# Patient Record
Sex: MALE | Race: BLACK OR AFRICAN AMERICAN | HISPANIC OR LATINO | Employment: FULL TIME | URBAN - METROPOLITAN AREA
[De-identification: names, ages, dates, MRNs, and addresses within clinical notes are randomized per-mention and may not be internally consistent; named-entity substitution may affect disease eponyms.]

---

## 2023-10-11 ENCOUNTER — APPOINTMENT (OUTPATIENT)
Dept: LAB | Facility: CLINIC | Age: 23
End: 2023-10-11
Payer: COMMERCIAL

## 2023-10-11 ENCOUNTER — OFFICE VISIT (OUTPATIENT)
Dept: FAMILY MEDICINE CLINIC | Facility: CLINIC | Age: 23
End: 2023-10-11
Payer: COMMERCIAL

## 2023-10-11 VITALS
OXYGEN SATURATION: 98 % | RESPIRATION RATE: 18 BRPM | HEART RATE: 68 BPM | SYSTOLIC BLOOD PRESSURE: 118 MMHG | WEIGHT: 221 LBS | BODY MASS INDEX: 29.29 KG/M2 | DIASTOLIC BLOOD PRESSURE: 72 MMHG | HEIGHT: 73 IN

## 2023-10-11 DIAGNOSIS — Z11.3 SCREENING EXAMINATION FOR STD (SEXUALLY TRANSMITTED DISEASE): ICD-10-CM

## 2023-10-11 DIAGNOSIS — Z72.51 HISTORY OF UNPROTECTED SEX: ICD-10-CM

## 2023-10-11 DIAGNOSIS — Z11.59 NEED FOR HEPATITIS C SCREENING TEST: ICD-10-CM

## 2023-10-11 DIAGNOSIS — Z00.00 ANNUAL PHYSICAL EXAM: Primary | ICD-10-CM

## 2023-10-11 DIAGNOSIS — Z11.4 SCREENING FOR HIV (HUMAN IMMUNODEFICIENCY VIRUS): ICD-10-CM

## 2023-10-11 DIAGNOSIS — Z11.3 SCREENING FOR STDS (SEXUALLY TRANSMITTED DISEASES): ICD-10-CM

## 2023-10-11 LAB
HCV AB SER QL: NORMAL
HIV 1+2 AB+HIV1 P24 AG SERPL QL IA: NORMAL
HIV 2 AB SERPL QL IA: NORMAL
HIV1 AB SERPL QL IA: NORMAL
HIV1 P24 AG SERPL QL IA: NORMAL
TREPONEMA PALLIDUM IGG+IGM AB [PRESENCE] IN SERUM OR PLASMA BY IMMUNOASSAY: NORMAL

## 2023-10-11 PROCEDURE — 87591 N.GONORRHOEAE DNA AMP PROB: CPT

## 2023-10-11 PROCEDURE — 86695 HERPES SIMPLEX TYPE 1 TEST: CPT

## 2023-10-11 PROCEDURE — 87491 CHLMYD TRACH DNA AMP PROBE: CPT

## 2023-10-11 PROCEDURE — 99385 PREV VISIT NEW AGE 18-39: CPT | Performed by: NURSE PRACTITIONER

## 2023-10-11 PROCEDURE — 36415 COLL VENOUS BLD VENIPUNCTURE: CPT

## 2023-10-11 PROCEDURE — 87389 HIV-1 AG W/HIV-1&-2 AB AG IA: CPT

## 2023-10-11 PROCEDURE — 86696 HERPES SIMPLEX TYPE 2 TEST: CPT

## 2023-10-11 PROCEDURE — 86803 HEPATITIS C AB TEST: CPT

## 2023-10-11 PROCEDURE — 86780 TREPONEMA PALLIDUM: CPT

## 2023-10-11 NOTE — PROGRESS NOTES
ADULT ANNUAL [facility]    NAME: Gaurav Rolon  AGE: 26 y.o. SEX: male  : 2000     DATE: 10/11/2023     Assessment and Plan:     Problem List Items Addressed This Visit          Other    Screening for HIV (human immunodeficiency virus)    Relevant Orders    HIV 1/2 AB/AG w Reflex [facility] for 2 yr old and above    History of unprotected sex    Relevant Orders    HIV 1/2 AB/AG w Reflex [facility] for 2 yr old and above    Chlamydia/GC amplified DNA by PCR    RPR-Syphilis Screening (Total Syphilis IGG/IGM)    Herpes I/II IgG RUDDY w Reflex to HSV-2    Need for hepatitis C screening test    Relevant Orders    Hepatitis C antibody    Annual physical exam - Primary  Patient instructed to eat a healthy low fat diet. STD testing ordered. Patient instructed to check with insurance for coverage. Will follow-up results with the patient. Patient does not want any other routine lab work ordered at this time. Patient instructed to follow-up in 1 year for a physical exam or sooner prn. Immunizations and preventive care screenings were discussed with patient today. Appropriate education was printed on patient's after visit summary. Counseling:  Dental Health: discussed importance of regular tooth brushing, flossing, and dental visits. Injury prevention: discussed safety/seat belts, safety helmets, smoke detectors, carbon monoxide detectors,   Exercise: the importance of regular exercise/physical activity was discussed. Recommend exercise 3-5 times per week for at least 30 minutes. BMI Counseling: Body mass index is 29.16 kg/m². The BMI is above normal. Nutrition recommendations include encouraging healthy choices of fruits and vegetables, decreasing fast food intake, consuming healthier snacks, reducing intake of saturated and trans fat and reducing intake of cholesterol. Exercise recommendations include exercising 3-5 times per week.  Rationale for BMI follow-up plan is due to patient being overweight or obese. Depression Screening and Follow-up Plan: Patient was screened for depression during today's encounter. They screened negative with a PHQ-2 score of 0. Return in 1 year (on 10/11/2024) for Annual physical.     Chief Complaint:     Chief Complaint   Patient presents with    Physical Exam      History of Present Illness:     Adult Annual Physical   Patient is here for a comprehensive physical exam.     Patient would like STD testing done. Patient reports that he has been sexually active without protection in the past. Denies any dysuria, penile pain, penile discharge, or rash. Diet and Physical Activity  Diet/Nutrition: high fat diet. Exercise:  Patient reports that he goes to the gym 4-5 days a week for 1-1.5 hours . Depression Screening  PHQ-2/9 Depression Screening    Little interest or pleasure in doing things: 0 - not at all  Feeling down, depressed, or hopeless: 0 - not at all  PHQ-2 Score: 0  PHQ-2 Interpretation: Negative depression screen       General Health  Sleep:  Patient reports 6-7 hours of sleep at night . Hearing: normal - bilateral.  Vision: no vision problems. Dental: no dental visits for >1 year and brushes teeth twice daily.  Health  History of STDs?: no.    Advanced Care Planning  Do you have an advanced directive? no  Do you have a durable medical power of ? no     Review of Systems:     Review of Systems   Constitutional:  Negative for appetite change, chills, fatigue and fever. HENT:  Negative for ear pain, sinus pressure, sore throat and trouble swallowing. Eyes:  Negative for pain, discharge and redness. Respiratory:  Negative for cough, chest tightness, shortness of breath and wheezing. Cardiovascular:  Negative for chest pain, palpitations and leg swelling. Gastrointestinal:  Negative for abdominal pain, blood in stool, diarrhea, nausea and vomiting.    Genitourinary: Negative for dysuria, frequency, hematuria, penile pain and testicular pain. Musculoskeletal:  Negative for arthralgias and myalgias. Skin:  Negative for rash. Neurological:  Negative for dizziness, seizures, syncope, light-headedness and headaches. Psychiatric/Behavioral:  Negative for suicidal ideas. Denies any depression. Past Medical History:     History reviewed. No pertinent past medical history. Past Surgical History:     Past Surgical History:   Procedure Laterality Date    ELBOW SURGERY        Social History:     Social History     Socioeconomic History    Marital status: Single     Spouse name: None    Number of children: None    Years of education: None    Highest education level: None   Occupational History    None   Tobacco Use    Smoking status: Never    Smokeless tobacco: Never   Vaping Use    Vaping Use: Never used   Substance and Sexual Activity    Alcohol use: Yes     Comment: Socially    Drug use: Not Currently    Sexual activity: Yes     Partners: Female     Birth control/protection: Condom Male   Other Topics Concern    None   Social History Narrative    None     Social Determinants of Health     Financial Resource Strain: Not on file   Food Insecurity: Not on file   Transportation Needs: Not on file   Physical Activity: Not on file   Stress: Not on file   Social Connections: Not on file   Intimate Partner Violence: Not on file   Housing Stability: Not on file      Family History:     Family History   Problem Relation Age of Onset    Diabetes Mother     Diabetes Maternal Grandmother       Current Medications:     No current outpatient medications on file. No current facility-administered medications for this visit. Allergies:     No Known Allergies   Physical Exam:     /72   Pulse 68   Resp 18   Ht 6' 1" (1.854 m)   Wt 100 kg (221 lb)   SpO2 98%   BMI 29.16 kg/m²     Physical Exam  Vitals reviewed.    Constitutional:       General: He is not in acute distress. Appearance: He is not ill-appearing or diaphoretic. HENT:      Right Ear: Tympanic membrane, ear canal and external ear normal.      Left Ear: Tympanic membrane, ear canal and external ear normal.      Nose: Nose normal.      Mouth/Throat:      Mouth: Mucous membranes are moist.      Pharynx: Oropharynx is clear. No oropharyngeal exudate or posterior oropharyngeal erythema. Eyes:      Conjunctiva/sclera: Conjunctivae normal.      Pupils: Pupils are equal, round, and reactive to light. Cardiovascular:      Rate and Rhythm: Normal rate and regular rhythm. Pulses: Normal pulses. Heart sounds: Normal heart sounds. Comments: No edema noted. Pulmonary:      Effort: Pulmonary effort is normal. No respiratory distress. Breath sounds: Normal breath sounds. No wheezing. Abdominal:      General: There is no distension. Palpations: Abdomen is soft. There is no mass. Tenderness: There is no abdominal tenderness. Genitourinary:     Comments: Patient deferred exam.   Musculoskeletal:         General: Normal range of motion. Cervical back: Normal range of motion. Comments: Gait wnl. Lymphadenopathy:      Cervical: No cervical adenopathy. Skin:     Findings: No rash. Neurological:      Mental Status: He is alert and oriented to person, place, and time. Cranial Nerves: No cranial nerve deficit.       Coordination: Coordination normal.      Gait: Gait normal.   Psychiatric:         Mood and Affect: Mood normal.          Marcus Forte, [address] [facility]

## 2023-10-11 NOTE — PATIENT INSTRUCTIONS
Wellness Visit for Adults   AMBULATORY CARE:   A wellness visit  is when you see your healthcare provider to get screened for health problems. Your healthcare provider will also give you advice on how to stay healthy. Write down your questions so you remember to ask them. Ask your healthcare provider how often you should have a wellness visit. What happens at a wellness visit:  Your healthcare provider will ask about your health, and your family history of health problems. This includes high blood pressure, heart disease, and cancer. He or she will ask if you have symptoms that concern you, if you smoke, and about your mood. You may also be asked about your intake of medicines, supplements, food, and alcohol. Any of the following may be done: Your weight  will be checked. Your height may also be checked so your body mass index (BMI) can be calculated. Your BMI shows if you are at a healthy weight. Your blood pressure  and heart rate will be checked. Your temperature may also be checked. Blood and urine tests  may be done. Blood tests may be done to check your cholesterol levels. Abnormal cholesterol levels increase your risk for heart disease and stroke. You may also need a blood or urine test to check for diabetes if you are at increased risk. Urine tests may be done to look for signs of an infection or kidney disease. A physical exam  includes checking your heartbeat and lungs with a stethoscope. Your healthcare provider may also check your skin to look for sun damage. Screening tests  may be recommended. A screening test is done to check for diseases that may not cause symptoms. The screening tests you may need depend on your age, gender, family history, and lifestyle habits. For example, colorectal screening may be recommended if you are 48years old or older. Screening tests you need if you are a woman:   A Pap smear  is used to screen for cervical cancer.  Pap smears are usually done every 3 to 5 years depending on your age. You may need them more often if you have had abnormal Pap smear test results in the past. Ask your healthcare provider how often you should have a Pap smear. A mammogram  is an x-ray of your breasts to screen for breast cancer. Experts recommend mammograms every 2 years starting at age 48 years. You may need a mammogram at age 52 years or younger if you have an increased risk for breast cancer. Talk to your healthcare provider about when you should start having mammograms and how often you need them. Vaccines you may need:   Get an influenza vaccine  every year. The influenza vaccine protects you from the flu. Several types of viruses cause the flu. The viruses change over time, so new vaccines are made each year. Get a tetanus-diphtheria (Td) booster vaccine  every 10 years. This vaccine protects you against tetanus and diphtheria. Tetanus is a severe infection that may cause painful muscle spasms and lockjaw. Diphtheria is a severe bacterial infection that causes a thick covering in the back of your mouth and throat. Get a human papillomavirus (HPV) vaccine  if you are female and aged 23 to 32 or male 23 to 24 and never received it. This vaccine protects you from HPV infection. HPV is the most common infection spread by sexual contact. HPV may also cause vaginal, penile, and anal cancers. Get a pneumococcal vaccine  if you are aged 72 years or older. The pneumococcal vaccine is an injection given to protect you from pneumococcal disease. Pneumococcal disease is an infection caused by pneumococcal bacteria. The infection may cause pneumonia, meningitis, or an ear infection. Get a shingles vaccine  if you are 60 or older, even if you have had shingles before. The shingles vaccine is an injection to protect you from the varicella-zoster virus. This is the same virus that causes chickenpox.  Shingles is a painful rash that develops in people who had chickenpox or have been exposed to the virus. How to eat healthy:  My Plate is a model for planning healthy meals. It shows the types and amounts of foods that should go on your plate. Fruits and vegetables make up about half of your plate, and grains and protein make up the other half. A serving of dairy is included on the side of your plate. The amount of calories and serving sizes you need depends on your age, gender, weight, and height. Examples of healthy foods are listed below:  Eat a variety of vegetables  such as dark green, red, and orange vegetables. You can also include canned vegetables low in sodium (salt) and frozen vegetables without added butter or sauces. Eat a variety of fresh fruits , canned fruit in 100% juice, frozen fruit, and dried fruit. Include whole grains. At least half of the grains you eat should be whole grains. Examples include whole-wheat bread, wheat pasta, brown rice, and whole-grain cereals such as oatmeal.    Eat a variety of protein foods such as seafood (fish and shellfish), lean meat, and poultry without skin (turkey and chicken). Examples of lean meats include pork leg, shoulder, or tenderloin, and beef round, sirloin, tenderloin, and extra lean ground beef. Other protein foods include eggs and egg substitutes, beans, peas, soy products, nuts, and seeds. Choose low-fat dairy products such as skim or 1% milk or low-fat yogurt, cheese, and cottage cheese. Limit unhealthy fats  such as butter, hard margarine, and shortening. Exercise:  Exercise at least 30 minutes per day on most days of the week. Some examples of exercise include walking, biking, dancing, and swimming. You can also fit in more physical activity by taking the stairs instead of the elevator or parking farther away from stores. Include muscle strengthening activities 2 days each week. Regular exercise provides many health benefits.  It helps you manage your weight, and decreases your risk for type 2 diabetes, heart disease, stroke, and high blood pressure. Exercise can also help improve your mood. Ask your healthcare provider about the best exercise plan for you. General health and safety guidelines:   Do not smoke. Nicotine and other chemicals in cigarettes and cigars can cause lung damage. Ask your healthcare provider for information if you currently smoke and need help to quit. E-cigarettes or smokeless tobacco still contain nicotine. Talk to your healthcare provider before you use these products. Limit alcohol. A drink of alcohol is 12 ounces of beer, 5 ounces of wine, or 1½ ounces of liquor. Lose weight, if needed. Being overweight increases your risk of certain health conditions. These include heart disease, high blood pressure, type 2 diabetes, and certain types of cancer. Protect your skin. Do not sunbathe or use tanning beds. Use sunscreen with a SPF 15 or higher. Apply sunscreen at least 15 minutes before you go outside. Reapply sunscreen every 2 hours. Wear protective clothing, hats, and sunglasses when you are outside. Drive safely. Always wear your seatbelt. Make sure everyone in your car wears a seatbelt. A seatbelt can save your life if you are in an accident. Do not use your cell phone when you are driving. This could distract you and cause an accident. Pull over if you need to make a call or send a text message. Practice safe sex. Use latex condoms if are sexually active and have more than one partner. Your healthcare provider may recommend screening tests for sexually transmitted infections (STIs). Wear helmets, lifejackets, and protective gear. Always wear a helmet when you ride a bike or motorcycle, go skiing, or play sports that could cause a head injury. Wear protective equipment when you play sports. Wear a lifejacket when you are on a boat or doing water sports.     © Copyright [employer] 2023 Information is for End User's use only and may not be sold, redistributed or otherwise used for commercial purposes. The above information is an  only. It is not intended as medical advice for individual conditions or treatments. Talk to your doctor, nurse or pharmacist before following any medical regimen to see if it is safe and effective for you.

## 2023-10-12 LAB
C TRACH DNA SPEC QL NAA+PROBE: NEGATIVE
HSV1 IGG SER IA-ACNC: >62.2 INDEX (ref 0–0.9)
HSV2 IGG SER IA-ACNC: <0.91 INDEX (ref 0–0.9)
N GONORRHOEA DNA SPEC QL NAA+PROBE: NEGATIVE

## 2023-10-16 ENCOUNTER — TELEPHONE (OUTPATIENT)
Dept: FAMILY MEDICINE CLINIC | Facility: CLINIC | Age: 23
End: 2023-10-16

## 2023-10-16 NOTE — TELEPHONE ENCOUNTER
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 All other STD testing was negative.

## 2023-12-10 PROBLEM — Z11.59 NEED FOR HEPATITIS C SCREENING TEST: Status: RESOLVED | Noted: 2023-10-11 | Resolved: 2023-12-10

## 2024-03-27 ENCOUNTER — RA CDI HCC (OUTPATIENT)
Dept: OTHER | Facility: HOSPITAL | Age: 24
End: 2024-03-27

## 2024-03-27 NOTE — PROGRESS NOTES
HCC coding opportunities       Chart reviewed, no opportunity found: CHART REVIEWED, NO OPPORTUNITY FOUND      This is a reminder to address (resolve/update/assess) ALL HCC (risk adjustment) codes as found on active problem list for 2024 as patient scores reset to zero ALVAREZ.  Also, just a reminder to please review and assess all other chronic conditions for 2024  Patients Insurance        Commercial Insurance: [employer]

## 2024-04-03 ENCOUNTER — OFFICE VISIT (OUTPATIENT)
Dept: FAMILY MEDICINE CLINIC | Facility: CLINIC | Age: 24
End: 2024-04-03
Payer: COMMERCIAL

## 2024-04-03 VITALS
WEIGHT: 224 LBS | HEART RATE: 75 BPM | OXYGEN SATURATION: 98 % | RESPIRATION RATE: 16 BRPM | DIASTOLIC BLOOD PRESSURE: 82 MMHG | HEIGHT: 73 IN | SYSTOLIC BLOOD PRESSURE: 130 MMHG | BODY MASS INDEX: 29.69 KG/M2

## 2024-04-03 DIAGNOSIS — Z11.4 SCREENING FOR HIV (HUMAN IMMUNODEFICIENCY VIRUS): ICD-10-CM

## 2024-04-03 DIAGNOSIS — Z72.51 UNPROTECTED SEX: ICD-10-CM

## 2024-04-03 DIAGNOSIS — Z11.3 SCREENING FOR STD (SEXUALLY TRANSMITTED DISEASE): Primary | ICD-10-CM

## 2024-04-03 DIAGNOSIS — N48.89 PENILE IRRITATION: ICD-10-CM

## 2024-04-03 PROCEDURE — 99213 OFFICE O/P EST LOW 20 MIN: CPT | Performed by: NURSE PRACTITIONER

## 2024-04-03 NOTE — PROGRESS NOTES
Name: Raz Gardiner      : 2000      MRN: [id number]  Encounter Provider: KASSIDY Anna  Encounter Date: 4/3/2024   Encounter department: [facility]    Assessment & Plan     1. Screening for STD (sexually transmitted disease)  -     HIV 1/2 AB/AG w Reflex [facility] for 2 yr old and above; Future; Expected date: 2024  -     RPR-Syphilis Screening (Total Syphilis IGG/IGM); Future; Expected date: 2024  -     Chlamydia/GC amplified DNA by PCR; Future; Expected date: 2024    2. Penile irritation  -     Ambulatory Referral to Urology; Future    3. Screening for HIV (human immunodeficiency virus)  -     HIV 1/2 AB/AG w Reflex [facility] for 2 yr old and above; Future; Expected date: 2024    4. Unprotected sex  -     HIV 1/2 AB/AG w Reflex [facility] for 2 yr old and above; Future; Expected date: 2024  -     RPR-Syphilis Screening (Total Syphilis IGG/IGM); Future; Expected date: 2024  -     Chlamydia/GC amplified DNA by PCR; Future; Expected date: 2024        Patient reports that he would like STD testing done.   Patient reports that he sometimes uses condoms.   Patient reports that his foreskin is usually irritated after sex.   Denies any penile lesions, penile discharge, fever, or dysuria.   Patient referred to urology for further evaluation of irritation after sex.   Importance of condom use discussed.   STD testing ordered. Will follow-up results with the patient.   Patient instructed to follow-up sooner prn.     Subjective      Patient reports that he would like STD testing done.   Patient reports that he sometimes uses condoms.   Patient reports that his foreskin is irritated after sex.   Denies any penile lesions.   Denies any penile discharge.   Denies any fever, dysuria, or increased urinary frequency.       Review of Systems   Constitutional:  Negative for chills and fever.   HENT:  Negative for congestion, ear pain and sore throat.   "  Respiratory:  Negative for cough, shortness of breath and wheezing.    Cardiovascular:  Negative for chest pain.   Gastrointestinal:  Negative for abdominal pain, diarrhea, nausea and vomiting.   Genitourinary:         As noted in HPI.    Skin:  Negative for rash.   Neurological:  Negative for dizziness, light-headedness and headaches.       No current outpatient medications on file prior to visit.       Objective     /82 (BP Location: Left arm, Patient Position: Sitting, Cuff Size: Standard)   Pulse 75   Resp 16   Ht 6' 1\" (1.854 m)   Wt 102 kg (224 lb)   SpO2 98%   BMI 29.55 kg/m²     Physical Exam  Vitals reviewed.   Constitutional:       General: He is not in acute distress.     Appearance: He is not ill-appearing or diaphoretic.   HENT:      Right Ear: External ear normal.      Left Ear: External ear normal.      Nose: Nose normal.      Mouth/Throat:      Mouth: Mucous membranes are moist.      Pharynx: Oropharynx is clear. No oropharyngeal exudate or posterior oropharyngeal erythema.   Eyes:      Conjunctiva/sclera: Conjunctivae normal.      Pupils: Pupils are equal, round, and reactive to light.   Cardiovascular:      Rate and Rhythm: Normal rate and regular rhythm.      Pulses: Normal pulses.      Heart sounds: Normal heart sounds.   Pulmonary:      Effort: Pulmonary effort is normal. No respiratory distress.      Breath sounds: Normal breath sounds. No wheezing.   Neurological:      Mental Status: He is alert and oriented to person, place, and time.   Psychiatric:         Mood and Affect: Mood normal.       KASSIDY Anna    "

## 2024-04-05 ENCOUNTER — APPOINTMENT (OUTPATIENT)
Dept: LAB | Facility: CLINIC | Age: 24
End: 2024-04-05
Payer: COMMERCIAL

## 2024-04-05 DIAGNOSIS — Z11.3 SCREENING FOR STD (SEXUALLY TRANSMITTED DISEASE): ICD-10-CM

## 2024-04-05 DIAGNOSIS — Z72.51 UNPROTECTED SEX: ICD-10-CM

## 2024-04-05 DIAGNOSIS — Z11.4 SCREENING FOR HIV (HUMAN IMMUNODEFICIENCY VIRUS): ICD-10-CM

## 2024-04-05 LAB
HIV 1+2 AB+HIV1 P24 AG SERPL QL IA: NORMAL
HIV 2 AB SERPL QL IA: NORMAL
HIV1 AB SERPL QL IA: NORMAL
HIV1 P24 AG SERPL QL IA: NORMAL
TREPONEMA PALLIDUM IGG+IGM AB [PRESENCE] IN SERUM OR PLASMA BY IMMUNOASSAY: NORMAL

## 2024-04-05 PROCEDURE — 87591 N.GONORRHOEAE DNA AMP PROB: CPT

## 2024-04-05 PROCEDURE — 87389 HIV-1 AG W/HIV-1&-2 AB AG IA: CPT

## 2024-04-05 PROCEDURE — 86780 TREPONEMA PALLIDUM: CPT

## 2024-04-05 PROCEDURE — 36415 COLL VENOUS BLD VENIPUNCTURE: CPT

## 2024-04-05 PROCEDURE — 87491 CHLMYD TRACH DNA AMP PROBE: CPT

## 2024-04-06 LAB
C TRACH DNA SPEC QL NAA+PROBE: NEGATIVE
N GONORRHOEA DNA SPEC QL NAA+PROBE: NEGATIVE

## 2024-04-08 ENCOUNTER — TELEPHONE (OUTPATIENT)
Dept: FAMILY MEDICINE CLINIC | Facility: CLINIC | Age: 24
End: 2024-04-08

## 2024-04-18 NOTE — PROGRESS NOTES
"  Assessment and plan:     Phimosis  Skin irritation and swelling especially after sexual intercourse  Not able to fully retract the foreskin without micro-tears and increase in swelling  Betamethasone 0.05% cream ordered, to be applied twice daily  Utilize lubrication during sexual intercourse to help with irritation and friction  We briefly discussed circumcision as an option, he will try the ointment first  Follow up in 6-8 weeks for reassessment       History of Present Illness     Raz Gardiner is a 24 y.o. new patient who presents to the office today for penile irritation.  He was referred from his PCP.  He was seen by them on 4/ 3/2024 for penile irration. He states that he mostly has the irritaion immediately following sexual intercourse. He states the tip of his penis becomes very swollen, red, painful, and has micro-tears. He is uncircumcised. He states that he has tried having sex with condoms to help with the irritation, but it does not help. He reports that he regularly gets screened for STIs and last testing was negative. He denies penile lesions or discharge. Denies dysuria, hematuria, fever/chills.       Laboratory     No results found for: \"BUN\", \"CREATININE\"    No components found for: \"GFR\"    No results found for: \"GLUCOSE\", \"CALCIUM\", \"NA\", \"K\", \"CO2\", \"CL\"    No results found for: \"WBC\", \"HGB\", \"HCT\", \"MCV\", \"PLT\"    No results found for: \"PSA\"    No results found for this or any previous visit (from the past 1 hour(s)).    Review of Systems     Review of Systems   Constitutional:  Negative for chills and fever.   Respiratory: Negative.  Negative for cough and shortness of breath.    Cardiovascular: Negative.  Negative for chest pain.   Gastrointestinal: Negative.  Negative for abdominal distention, abdominal pain, nausea and vomiting.   Genitourinary:  Positive for penile pain and penile swelling. Negative for decreased urine volume, difficulty urinating, dysuria, flank pain, frequency, " "hematuria, penile discharge, scrotal swelling, testicular pain and urgency.   Skin: Negative.  Negative for rash.   Neurological: Negative.    Hematological:  Negative for adenopathy. Does not bruise/bleed easily.       Allergies     No Known Allergies    Physical Exam     Physical Exam  Vitals reviewed.   Constitutional:       Appearance: Normal appearance.   HENT:      Head: Normocephalic and atraumatic.   Eyes:      Pupils: Pupils are equal, round, and reactive to light.   Cardiovascular:      Rate and Rhythm: Normal rate.   Pulmonary:      Effort: Pulmonary effort is normal.   Genitourinary:     Comments: Phallus with no redness, swelling, drainage, lesions, or masses. Uncircumcised. Foreskin does appear to be slightly tight when pulled back completely. Small micro-tears noted around phimotic ring.   Musculoskeletal:      Cervical back: Normal range of motion.   Skin:     General: Skin is warm and dry.   Neurological:      General: No focal deficit present.      Mental Status: He is alert and oriented to person, place, and time.   Psychiatric:         Mood and Affect: Mood normal.         Behavior: Behavior normal.         Thought Content: Thought content normal.         Judgment: Judgment normal.         Vital Signs     Vitals:    04/19/24 0756   BP: 116/68   BP Location: Left arm   Patient Position: Sitting   Cuff Size: Adult   Pulse: 76   Resp: 16   SpO2: 98%   Weight: 102 kg (224 lb)   Height: 6' 1\" (1.854 m)       Current Medications       Current Outpatient Medications:     betamethasone dipropionate (DIPROSONE) 0.05 % cream, Apply twice daily for 4-8 weeks around the penile gland and foreskin, Disp: 45 g, Rfl: 0    Active Problems     Patient Active Problem List   Diagnosis    Screening for HIV (human immunodeficiency virus)    History of unprotected sex    Annual physical exam    Screening for STD (sexually transmitted disease)    Unprotected sex    Penile irritation    Phimosis       Past Medical " History     History reviewed. No pertinent past medical history.    Surgical History     Past Surgical History:   Procedure Laterality Date    ELBOW SURGERY         Family History     Family History   Problem Relation Age of Onset    Diabetes Mother     Diabetes Maternal Grandmother        Social History     Social History     Social History     Tobacco Use   Smoking Status Never   Smokeless Tobacco Never       Past Surgical History:   Procedure Laterality Date    ELBOW SURGERY           The following portions of the patient's history were reviewed and updated as appropriate: allergies, current medications, past family history, past medical history, past social history, past surgical history and problem list    Please note :  Voice dictation software has been used to create this document.  There may be inadvertent transcription errors.    KASSIDY Yates

## 2024-04-19 ENCOUNTER — OFFICE VISIT (OUTPATIENT)
Dept: UROLOGY | Facility: AMBULATORY SURGERY CENTER | Age: 24
End: 2024-04-19
Payer: COMMERCIAL

## 2024-04-19 VITALS
RESPIRATION RATE: 16 BRPM | HEART RATE: 76 BPM | HEIGHT: 73 IN | OXYGEN SATURATION: 98 % | BODY MASS INDEX: 29.69 KG/M2 | SYSTOLIC BLOOD PRESSURE: 116 MMHG | WEIGHT: 224 LBS | DIASTOLIC BLOOD PRESSURE: 68 MMHG

## 2024-04-19 DIAGNOSIS — N47.1 PHIMOSIS: Primary | ICD-10-CM

## 2024-04-19 DIAGNOSIS — N48.89 PENILE IRRITATION: ICD-10-CM

## 2024-04-19 PROCEDURE — 99204 OFFICE O/P NEW MOD 45 MIN: CPT

## 2024-04-19 RX ORDER — BETAMETHASONE DIPROPIONATE 0.5 MG/G
CREAM TOPICAL
Qty: 45 G | Refills: 0 | Status: SHIPPED | OUTPATIENT
Start: 2024-04-19

## 2024-04-19 NOTE — ASSESSMENT & PLAN NOTE
Skin irritation and swelling especially after sexual intercourse  Not able to fully retract the foreskin without micro-tears and increase in swelling  Betamethasone 0.05% cream ordered, to be applied twice daily  Utilize lubrication during sexual intercourse to help with irritation and friction  We briefly discussed circumcision as an option, he will try the ointment first  STI testing in April 2024 negative  Follow up in 6-8 weeks for reassessment

## 2024-05-03 PROBLEM — Z11.3 SCREENING FOR STD (SEXUALLY TRANSMITTED DISEASE): Status: RESOLVED | Noted: 2024-04-03 | Resolved: 2024-05-03

## 2024-05-14 ENCOUNTER — TELEPHONE (OUTPATIENT)
Age: 24
End: 2024-05-14

## 2024-05-14 NOTE — TELEPHONE ENCOUNTER
Experiencing same issues as before. Interested in getting circumcision surgery and could someone call him back to go over the process with him.    CB: [phone]

## 2024-05-14 NOTE — TELEPHONE ENCOUNTER
We have follow-up appointment scheduled in a few weeks.  We can discuss the surgery at that time and plan to sign surgical consent.  Would prefer to see in office instead of doing phone call to discuss surgery and risks associated in person.  It is also better to sign the consent in person as opposed to waiting for the day of surgery.  If he wants to be seen sooner he can be placed on our cancellation list.

## 2024-05-15 NOTE — TELEPHONE ENCOUNTER
LM that he is scheduled 6/11 in [facility]. weeks. Provider will discuss the surgery at that time and plan to sign surgical consent.  Would prefer to see in office instead of doing phone call to discuss surgery and risks associated in person.  It is also better to sign the consent in person as opposed to waiting for the day of surgery.  If he wants to be seen sooner he can be placed on our cancellation list.

## 2024-05-15 NOTE — TELEPHONE ENCOUNTER
Patient returning call and informed of above message.    Placed patient on cancellation list per request

## 2024-06-11 ENCOUNTER — OFFICE VISIT (OUTPATIENT)
Dept: UROLOGY | Facility: AMBULATORY SURGERY CENTER | Age: 24
End: 2024-06-11

## 2024-06-11 VITALS
HEART RATE: 83 BPM | WEIGHT: 228 LBS | OXYGEN SATURATION: 99 % | HEIGHT: 73 IN | SYSTOLIC BLOOD PRESSURE: 110 MMHG | DIASTOLIC BLOOD PRESSURE: 80 MMHG | BODY MASS INDEX: 30.22 KG/M2

## 2024-06-11 DIAGNOSIS — N47.1 PHIMOSIS: Primary | ICD-10-CM

## 2024-06-11 DIAGNOSIS — N48.89 PENILE IRRITATION: ICD-10-CM

## 2024-06-11 NOTE — H&P (VIEW-ONLY)
Assessment and plan:     Phimosis  Patient with extremely tight foreskin with penile irritation, microtears and swelling noted after sexual intercourse, due to microtears puts patient at a higher risk for risk of infection  Utilize betamethasone 0.05% cream twice daily for 2 weeks, no benefit or efficacy noted  Has utilized lubrication and condoms during sexual intercourse but continues with microtears and swelling at the penile head  He is very interested in proceeding with a circumcision to reduce bothersome symptoms  Consent obtained and signed for circumcision today in the office, risks discussed, did discuss that he would have to avoid sexual intercourse/masturbation for at least 6-8 weeks, he works as a  in [employer] and will need at least one week off from work for healing, we did discuss that due to the physicality of his job he may require 2 weeks, he is understanding of this and our office will provide a note for excuse from work   Plan for surgery scheduler to reach out to patient regarding date of surgery and will plan for postop follow-up 2 weeks after surgery has been performed     Penile irritation  Microtears at penile head with irritation and mild swelling, especially after sexual intercourse  Attempted use of betamethasone ointment with no benefit  Proceed with circumcision in operating room      History of Present Illness     Raz Gardiner is a 24 y.o. male who presents today to the office for follow-up of phimosis.  He was last seen on/3/2024 and prescribed betamethasone cream for tight foreskin.  Today in the office he states that he is continuing with these bothersome symptoms.  He reports penile irritation, microtears, and swelling especially after sexual intercourse.   He reports that once he has the microtears it makes it very uncomfortable for him to utilize the restroom as it burns and causes more irritation. He did utilize the betamethasone cream for approximately 2 weeks twice daily and  "noticed minimal benefit or efficacy.  He is very frustrated with this as he is in a monogamous relationship and is not able to have sexual intercourse without pain or discomfort.  He is very interested in pursuing circumcision to reduce these bothersome symptoms.    Laboratory     No results found for: \"BUN\", \"CREATININE\"    No components found for: \"GFR\"    No results found for: \"GLUCOSE\", \"CALCIUM\", \"NA\", \"K\", \"CO2\", \"CL\"    No results found for: \"WBC\", \"HGB\", \"HCT\", \"MCV\", \"PLT\"    No results found for: \"PSA\"    No results found for this or any previous visit (from the past 1 hour(s)).    Review of Systems     Review of Systems   Constitutional:  Negative for chills and fever.   Respiratory: Negative.  Negative for cough and shortness of breath.    Cardiovascular: Negative.  Negative for chest pain.   Gastrointestinal: Negative.  Negative for abdominal distention, abdominal pain, nausea and vomiting.   Genitourinary:  Negative for decreased urine volume, difficulty urinating, dysuria, flank pain, frequency, hematuria, penile discharge, penile pain, penile swelling, scrotal swelling, testicular pain and urgency.   Skin: Negative.  Negative for rash.   Neurological: Negative.    Hematological:  Negative for adenopathy. Does not bruise/bleed easily.       AUA SYMPTOM SCORE      Flowsheet Row Most Recent Value   AUA SYMPTOM SCORE    How often have you had a sensation of not emptying your bladder completely after you finished urinating? 0 (P)     How often have you had to urinate again less than two hours after you finished urinating? 3 (P)     How often have you found you stopped and started again several times when you urinate? 0 (P)     How often have you found it difficult to postpone urination? 0 (P)     How often have you had a weak urinary stream? 0 (P)     How often have you had to push or strain to begin urination? 0 (P)     How many times did you most typically get up to urinate from the time you went to bed " "at night until the time you got up in the morning? 0 (P)     Quality of Life: If you were to spend the rest of your life with your urinary condition just the way it is now, how would you feel about that? 0 (P)     AUA SYMPTOM SCORE 3 (P)                Allergies     No Known Allergies    Physical Exam     Physical Exam  Vitals reviewed.   Constitutional:       Appearance: Normal appearance.   HENT:      Head: Normocephalic and atraumatic.   Eyes:      Pupils: Pupils are equal, round, and reactive to light.   Cardiovascular:      Rate and Rhythm: Normal rate.   Pulmonary:      Effort: Pulmonary effort is normal.   Genitourinary:     Comments: Penis with micro tears that are painful with palpation, mild swelling at the penile head   Musculoskeletal:      Cervical back: Normal range of motion.   Skin:     General: Skin is warm and dry.   Neurological:      General: No focal deficit present.      Mental Status: He is alert and oriented to person, place, and time.   Psychiatric:         Mood and Affect: Mood normal.         Behavior: Behavior normal.         Thought Content: Thought content normal.         Judgment: Judgment normal.         Vital Signs     Vitals:    06/11/24 0925   BP: 110/80   BP Location: Left arm   Patient Position: Sitting   Cuff Size: Adult   Pulse: 83   SpO2: 99%   Weight: 103 kg (228 lb)   Height: 6' 1\" (1.854 m)       Current Medications       Current Outpatient Medications:     betamethasone dipropionate (DIPROSONE) 0.05 % cream, Apply twice daily for 4-8 weeks around the penile gland and foreskin, Disp: 45 g, Rfl: 0    Active Problems     Patient Active Problem List   Diagnosis    Screening for HIV (human immunodeficiency virus)    History of unprotected sex    Annual physical exam    Unprotected sex    Penile irritation    Phimosis       Past Medical History     History reviewed. No pertinent past medical history.    Surgical History     Past Surgical History:   Procedure Laterality Date    " ELBOW SURGERY         Family History     Family History   Problem Relation Age of Onset    Diabetes Mother     Diabetes Maternal Grandmother        Social History     Social History     Social History     Tobacco Use   Smoking Status Never   Smokeless Tobacco Never       Past Surgical History:   Procedure Laterality Date    ELBOW SURGERY           The following portions of the patient's history were reviewed and updated as appropriate: allergies, current medications, past family history, past medical history, past social history, past surgical history and problem list    Please note :  Voice dictation software has been used to create this document.  There may be inadvertent transcription errors.    KASSIDY Yates

## 2024-06-11 NOTE — ASSESSMENT & PLAN NOTE
Microtears at penile head with irritation and mild swelling, especially after sexual intercourse  Attempted use of betamethasone ointment with no benefit  Proceed with circumcision in operating room

## 2024-06-11 NOTE — PROGRESS NOTES
Assessment and plan:     Phimosis  Patient with extremely tight foreskin with penile irritation, microtears and swelling noted after sexual intercourse, due to microtears puts patient at a higher risk for risk of infection  Utilize betamethasone 0.05% cream twice daily for 2 weeks, no benefit or efficacy noted  Has utilized lubrication and condoms during sexual intercourse but continues with microtears and swelling at the penile head  He is very interested in proceeding with a circumcision to reduce bothersome symptoms  Consent obtained and signed for circumcision today in the office, risks discussed, did discuss that he would have to avoid sexual intercourse/masturbation for at least 6-8 weeks, he works as a  in [employer] and will need at least one week off from work for healing, we did discuss that due to the physicality of his job he may require 2 weeks, he is understanding of this and our office will provide a note for excuse from work   Plan for surgery scheduler to reach out to patient regarding date of surgery and will plan for postop follow-up 2 weeks after surgery has been performed     Penile irritation  Microtears at penile head with irritation and mild swelling, especially after sexual intercourse  Attempted use of betamethasone ointment with no benefit  Proceed with circumcision in operating room      History of Present Illness     Raz Gardiner is a 24 y.o. male who presents today to the office for follow-up of phimosis.  He was last seen on/3/2024 and prescribed betamethasone cream for tight foreskin.  Today in the office he states that he is continuing with these bothersome symptoms.  He reports penile irritation, microtears, and swelling especially after sexual intercourse.   He reports that once he has the microtears it makes it very uncomfortable for him to utilize the restroom as it burns and causes more irritation. He did utilize the betamethasone cream for approximately 2 weeks twice daily and  "noticed minimal benefit or efficacy.  He is very frustrated with this as he is in a monogamous relationship and is not able to have sexual intercourse without pain or discomfort.  He is very interested in pursuing circumcision to reduce these bothersome symptoms.    Laboratory     No results found for: \"BUN\", \"CREATININE\"    No components found for: \"GFR\"    No results found for: \"GLUCOSE\", \"CALCIUM\", \"NA\", \"K\", \"CO2\", \"CL\"    No results found for: \"WBC\", \"HGB\", \"HCT\", \"MCV\", \"PLT\"    No results found for: \"PSA\"    No results found for this or any previous visit (from the past 1 hour(s)).    Review of Systems     Review of Systems   Constitutional:  Negative for chills and fever.   Respiratory: Negative.  Negative for cough and shortness of breath.    Cardiovascular: Negative.  Negative for chest pain.   Gastrointestinal: Negative.  Negative for abdominal distention, abdominal pain, nausea and vomiting.   Genitourinary:  Negative for decreased urine volume, difficulty urinating, dysuria, flank pain, frequency, hematuria, penile discharge, penile pain, penile swelling, scrotal swelling, testicular pain and urgency.   Skin: Negative.  Negative for rash.   Neurological: Negative.    Hematological:  Negative for adenopathy. Does not bruise/bleed easily.       AUA SYMPTOM SCORE      Flowsheet Row Most Recent Value   AUA SYMPTOM SCORE    How often have you had a sensation of not emptying your bladder completely after you finished urinating? 0 (P)     How often have you had to urinate again less than two hours after you finished urinating? 3 (P)     How often have you found you stopped and started again several times when you urinate? 0 (P)     How often have you found it difficult to postpone urination? 0 (P)     How often have you had a weak urinary stream? 0 (P)     How often have you had to push or strain to begin urination? 0 (P)     How many times did you most typically get up to urinate from the time you went to bed " "at night until the time you got up in the morning? 0 (P)     Quality of Life: If you were to spend the rest of your life with your urinary condition just the way it is now, how would you feel about that? 0 (P)     AUA SYMPTOM SCORE 3 (P)                Allergies     No Known Allergies    Physical Exam     Physical Exam  Vitals reviewed.   Constitutional:       Appearance: Normal appearance.   HENT:      Head: Normocephalic and atraumatic.   Eyes:      Pupils: Pupils are equal, round, and reactive to light.   Cardiovascular:      Rate and Rhythm: Normal rate.   Pulmonary:      Effort: Pulmonary effort is normal.   Genitourinary:     Comments: Penis with micro tears that are painful with palpation, mild swelling at the penile head   Musculoskeletal:      Cervical back: Normal range of motion.   Skin:     General: Skin is warm and dry.   Neurological:      General: No focal deficit present.      Mental Status: He is alert and oriented to person, place, and time.   Psychiatric:         Mood and Affect: Mood normal.         Behavior: Behavior normal.         Thought Content: Thought content normal.         Judgment: Judgment normal.         Vital Signs     Vitals:    06/11/24 0925   BP: 110/80   BP Location: Left arm   Patient Position: Sitting   Cuff Size: Adult   Pulse: 83   SpO2: 99%   Weight: 103 kg (228 lb)   Height: 6' 1\" (1.854 m)       Current Medications       Current Outpatient Medications:     betamethasone dipropionate (DIPROSONE) 0.05 % cream, Apply twice daily for 4-8 weeks around the penile gland and foreskin, Disp: 45 g, Rfl: 0    Active Problems     Patient Active Problem List   Diagnosis    Screening for HIV (human immunodeficiency virus)    History of unprotected sex    Annual physical exam    Unprotected sex    Penile irritation    Phimosis       Past Medical History     History reviewed. No pertinent past medical history.    Surgical History     Past Surgical History:   Procedure Laterality Date    " ELBOW SURGERY         Family History     Family History   Problem Relation Age of Onset    Diabetes Mother     Diabetes Maternal Grandmother        Social History     Social History     Social History     Tobacco Use   Smoking Status Never   Smokeless Tobacco Never       Past Surgical History:   Procedure Laterality Date    ELBOW SURGERY           The following portions of the patient's history were reviewed and updated as appropriate: allergies, current medications, past family history, past medical history, past social history, past surgical history and problem list    Please note :  Voice dictation software has been used to create this document.  There may be inadvertent transcription errors.    KASSIDY Yates

## 2024-06-11 NOTE — ASSESSMENT & PLAN NOTE
Patient with extremely tight foreskin with penile irritation, microtears and swelling noted after sexual intercourse, due to microtears puts patient at a higher risk for risk of infection  Utilize betamethasone 0.05% cream twice daily for 2 weeks, no benefit or efficacy noted  Has utilized lubrication and condoms during sexual intercourse but continues with microtears and swelling at the penile head  He is very interested in proceeding with a circumcision to reduce bothersome symptoms  Consent obtained and signed for circumcision today in the office, risks discussed, did discuss that he would have to avoid sexual intercourse/masturbation for at least 6-8 weeks, he works as a  in [employer] and will need at least one week off from work for healing, we did discuss that due to the physicality of his job he may require 2 weeks, he is understanding of this and our office will provide a note for excuse from work   Plan for surgery scheduler to reach out to patient regarding date of surgery and will plan for postop follow-up 2 weeks after surgery has been performed

## 2024-06-18 ENCOUNTER — TELEPHONE (OUTPATIENT)
Dept: UROLOGY | Facility: AMBULATORY SURGERY CENTER | Age: 24
End: 2024-06-18

## 2024-06-18 ENCOUNTER — PREP FOR PROCEDURE (OUTPATIENT)
Dept: UROLOGY | Facility: AMBULATORY SURGERY CENTER | Age: 24
End: 2024-06-18

## 2024-06-18 DIAGNOSIS — R39.89 SUSPECTED UTI: ICD-10-CM

## 2024-06-18 DIAGNOSIS — N48.89 PENILE IRRITATION: Primary | ICD-10-CM

## 2024-06-18 DIAGNOSIS — Z01.812 PRE-OPERATIVE LABORATORY EXAMINATION: ICD-10-CM

## 2024-06-18 NOTE — TELEPHONE ENCOUNTER
Called patient to schedule surgery. Patient was unable to answer call. I was unable to leave a voicemail due to mailbox being full. Will attempt to call again at a later time.

## 2024-06-18 NOTE — TELEPHONE ENCOUNTER
Andressa returned call to schedule surgery. Patient was given a few dates and stated he will give a call back as he wants to confirm with his employer what date works best before scheduling. Patient will be returning call with an answer by this afternoon.

## 2024-06-18 NOTE — TELEPHONE ENCOUNTER
Spoke with patient and confirmed surgery date of: 7/10/24  Type of surgery: Circumcision  Operating physician: Dr. New  Location of surgery:  [facility]    Verbally went over prep with patient on:   NPO  Bowel prep? No  Hospital calls afternoon prior with arrival time -Calls Friday afternoon for Monday surgeries  Patient needs ride to and from surgery (outpatient/inpatient)   Pre-op testing to be done 2 weeks prior to surgery  ucx  Blood thinners:   Aspirin and vitamins  Clearances needed: None    Mailed/emailed to patient on: 6/19/24  Copy of packet scanned into Media on: 6/19/24  Labs in packet  Soap / Bowel prep in packet  Pre-op & Post-op in packet    Consent: in Media

## 2024-06-26 ENCOUNTER — APPOINTMENT (OUTPATIENT)
Dept: LAB | Facility: CLINIC | Age: 24
End: 2024-06-26
Payer: COMMERCIAL

## 2024-06-26 DIAGNOSIS — R39.89 SUSPECTED UTI: ICD-10-CM

## 2024-06-26 DIAGNOSIS — Z01.812 PRE-OPERATIVE LABORATORY EXAMINATION: ICD-10-CM

## 2024-06-26 DIAGNOSIS — N48.89 PENILE IRRITATION: ICD-10-CM

## 2024-06-26 PROCEDURE — 87086 URINE CULTURE/COLONY COUNT: CPT

## 2024-06-27 LAB — BACTERIA UR CULT: NORMAL

## 2024-06-28 RX ORDER — CREATINE 100 %
POWDER (GRAM) MISCELLANEOUS DAILY
COMMUNITY

## 2024-06-28 NOTE — PRE-PROCEDURE INSTRUCTIONS
Pre-Surgery Instructions:   Medication Instructions    betamethasone dipropionate (DIPROSONE) 0.05 % cream Hold day of surgery.    Creatine POWD Stop taking 7 days prior to surgery.    Protein POWD Stop taking 7 days prior to surgery.   Medication instructions for day surgery reviewed. Please use only a sip of water to take your instructed medications. Avoid all over the counter vitamins, supplements and NSAIDS for one week prior to surgery per anesthesia guidelines. Tylenol is ok to take as needed.     You will receive a call one business day prior to surgery with an arrival time and hospital directions. If your surgery is scheduled on a Monday, the hospital will be calling you on the Friday prior to your surgery. If you have not heard from anyone by 8pm, please call the hospital supervisor through the hospital  at [phone]. ([place] [phone] or [place] [phone]).    Do not eat or drink anything after midnight the night before your surgery, including candy, mints, lifesavers, or chewing gum. Do not drink alcohol 24hrs before your surgery. Try not to smoke at least 24hrs before your surgery.       Follow the pre surgery showering instructions as listed in the “My Surgical Experience Booklet” or otherwise provided by your surgeon's office. Do not use a blade to shave the surgical area 1 week before surgery. It is okay to use a clean electric clippers up to 24 hours before surgery. Do not apply any lotions, creams, including makeup, cologne, deodorant, or perfumes after showering on the day of your surgery. Do not use dry shampoo, hair spray, hair gel, or any type of hair products.     No contact lenses, eye make-up, or artificial eyelashes. Remove nail polish, including gel polish, and any artificial, gel, or acrylic nails if possible. Remove all jewelry including rings and body piercing jewelry.     Wear causal clothing that is easy to take on and off. Consider your type of surgery.    Keep any  valuables, jewelry, piercings at home. Please bring any specially ordered equipment (sling, braces) if indicated.    Arrange for a responsible person to drive you to and from the hospital on the day of your surgery. Please confirm the visitor policy for the day of your procedure when you receive your phone call with an arrival time.     Call the surgeon's office with any new illnesses, exposures, or additional questions prior to surgery.    Please reference your “My Surgical Experience Booklet” for additional information to prepare for your upcoming surgery.

## 2024-07-09 ENCOUNTER — ANESTHESIA EVENT (OUTPATIENT)
Dept: PERIOP | Facility: HOSPITAL | Age: 24
End: 2024-07-09
Payer: COMMERCIAL

## 2024-07-10 ENCOUNTER — ANESTHESIA (OUTPATIENT)
Dept: PERIOP | Facility: HOSPITAL | Age: 24
End: 2024-07-10
Payer: COMMERCIAL

## 2024-07-10 ENCOUNTER — TELEPHONE (OUTPATIENT)
Dept: UROLOGY | Facility: MEDICAL CENTER | Age: 24
End: 2024-07-10

## 2024-07-10 ENCOUNTER — HOSPITAL ENCOUNTER (OUTPATIENT)
Facility: HOSPITAL | Age: 24
Setting detail: OUTPATIENT SURGERY
Discharge: HOME | End: 2024-07-10
Attending: UROLOGY | Admitting: UROLOGY
Payer: COMMERCIAL

## 2024-07-10 VITALS
WEIGHT: 224.65 LBS | HEIGHT: 73 IN | HEART RATE: 58 BPM | TEMPERATURE: 97 F | DIASTOLIC BLOOD PRESSURE: 66 MMHG | RESPIRATION RATE: 16 BRPM | SYSTOLIC BLOOD PRESSURE: 113 MMHG | BODY MASS INDEX: 29.77 KG/M2 | OXYGEN SATURATION: 99 %

## 2024-07-10 DIAGNOSIS — N47.1 PHIMOSIS: ICD-10-CM

## 2024-07-10 DIAGNOSIS — N48.89 PENILE IRRITATION: ICD-10-CM

## 2024-07-10 PROCEDURE — 54161 CIRCUM 28 DAYS OR OLDER: CPT | Performed by: UROLOGY

## 2024-07-10 RX ORDER — DEXAMETHASONE SODIUM PHOSPHATE 4 MG/ML
INJECTION, SOLUTION INTRA-ARTICULAR; INTRALESIONAL; INTRAMUSCULAR; INTRAVENOUS; SOFT TISSUE AS NEEDED
Status: DISCONTINUED | OUTPATIENT
Start: 2024-07-10 | End: 2024-07-10

## 2024-07-10 RX ORDER — MIDAZOLAM HYDROCHLORIDE 2 MG/2ML
INJECTION, SOLUTION INTRAMUSCULAR; INTRAVENOUS AS NEEDED
Status: DISCONTINUED | OUTPATIENT
Start: 2024-07-10 | End: 2024-07-10

## 2024-07-10 RX ORDER — LIDOCAINE HYDROCHLORIDE 20 MG/ML
INJECTION, SOLUTION EPIDURAL; INFILTRATION; INTRACAUDAL; PERINEURAL AS NEEDED
Status: DISCONTINUED | OUTPATIENT
Start: 2024-07-10 | End: 2024-07-10

## 2024-07-10 RX ORDER — CEFAZOLIN SODIUM 2 G/50ML
2000 SOLUTION INTRAVENOUS ONCE
Status: COMPLETED | OUTPATIENT
Start: 2024-07-10 | End: 2024-07-10

## 2024-07-10 RX ORDER — ONDANSETRON 2 MG/ML
INJECTION INTRAMUSCULAR; INTRAVENOUS AS NEEDED
Status: DISCONTINUED | OUTPATIENT
Start: 2024-07-10 | End: 2024-07-10

## 2024-07-10 RX ORDER — ACETAMINOPHEN 10 MG/ML
1000 INJECTION, SOLUTION INTRAVENOUS ONCE
Status: COMPLETED | OUTPATIENT
Start: 2024-07-10 | End: 2024-07-10

## 2024-07-10 RX ORDER — IBUPROFEN 600 MG/1
600 TABLET ORAL EVERY 6 HOURS PRN
Qty: 30 TABLET | Refills: 0 | Status: SHIPPED | OUTPATIENT
Start: 2024-07-10

## 2024-07-10 RX ORDER — HYDROMORPHONE HCL/PF 1 MG/ML
0.5 SYRINGE (ML) INJECTION
Status: DISCONTINUED | OUTPATIENT
Start: 2024-07-10 | End: 2024-07-10 | Stop reason: HOSPADM

## 2024-07-10 RX ORDER — MAGNESIUM HYDROXIDE 1200 MG/15ML
LIQUID ORAL AS NEEDED
Status: DISCONTINUED | OUTPATIENT
Start: 2024-07-10 | End: 2024-07-10 | Stop reason: HOSPADM

## 2024-07-10 RX ORDER — KETOROLAC TROMETHAMINE 30 MG/ML
15 INJECTION, SOLUTION INTRAMUSCULAR; INTRAVENOUS ONCE
Status: DISCONTINUED | OUTPATIENT
Start: 2024-07-10 | End: 2024-07-10 | Stop reason: HOSPADM

## 2024-07-10 RX ORDER — FENTANYL CITRATE/PF 50 MCG/ML
50 SYRINGE (ML) INJECTION
Status: DISCONTINUED | OUTPATIENT
Start: 2024-07-10 | End: 2024-07-10 | Stop reason: HOSPADM

## 2024-07-10 RX ORDER — HYDROCODONE BITARTRATE AND ACETAMINOPHEN 5; 325 MG/1; MG/1
1 TABLET ORAL EVERY 6 HOURS PRN
Qty: 5 TABLET | Refills: 0 | Status: SHIPPED | OUTPATIENT
Start: 2024-07-10 | End: 2024-07-20

## 2024-07-10 RX ORDER — SODIUM CHLORIDE 9 MG/ML
125 INJECTION, SOLUTION INTRAVENOUS CONTINUOUS
Status: DISCONTINUED | OUTPATIENT
Start: 2024-07-10 | End: 2024-07-10 | Stop reason: HOSPADM

## 2024-07-10 RX ORDER — FENTANYL CITRATE 50 UG/ML
INJECTION, SOLUTION INTRAMUSCULAR; INTRAVENOUS AS NEEDED
Status: DISCONTINUED | OUTPATIENT
Start: 2024-07-10 | End: 2024-07-10

## 2024-07-10 RX ORDER — GINSENG 100 MG
CAPSULE ORAL AS NEEDED
Status: DISCONTINUED | OUTPATIENT
Start: 2024-07-10 | End: 2024-07-10 | Stop reason: HOSPADM

## 2024-07-10 RX ORDER — PROPOFOL 10 MG/ML
INJECTION, EMULSION INTRAVENOUS AS NEEDED
Status: DISCONTINUED | OUTPATIENT
Start: 2024-07-10 | End: 2024-07-10

## 2024-07-10 RX ORDER — ONDANSETRON 2 MG/ML
4 INJECTION INTRAMUSCULAR; INTRAVENOUS ONCE AS NEEDED
Status: DISCONTINUED | OUTPATIENT
Start: 2024-07-10 | End: 2024-07-10 | Stop reason: HOSPADM

## 2024-07-10 RX ADMIN — FENTANYL CITRATE 50 MCG: 50 INJECTION INTRAMUSCULAR; INTRAVENOUS at 08:59

## 2024-07-10 RX ADMIN — FENTANYL CITRATE 50 MCG: 50 INJECTION INTRAMUSCULAR; INTRAVENOUS at 09:03

## 2024-07-10 RX ADMIN — ONDANSETRON 4 MG: 2 INJECTION INTRAMUSCULAR; INTRAVENOUS at 09:06

## 2024-07-10 RX ADMIN — PROPOFOL 250 MG: 10 INJECTION, EMULSION INTRAVENOUS at 09:00

## 2024-07-10 RX ADMIN — LIDOCAINE HYDROCHLORIDE 40 MG: 20 INJECTION, SOLUTION EPIDURAL; INFILTRATION; INTRACAUDAL at 09:00

## 2024-07-10 RX ADMIN — ACETAMINOPHEN 1000 MG: 10 INJECTION INTRAVENOUS at 09:09

## 2024-07-10 RX ADMIN — SODIUM CHLORIDE: 0.9 INJECTION, SOLUTION INTRAVENOUS at 09:33

## 2024-07-10 RX ADMIN — MIDAZOLAM 2 MG: 1 INJECTION INTRAMUSCULAR; INTRAVENOUS at 08:57

## 2024-07-10 RX ADMIN — MIDAZOLAM 2 MG: 1 INJECTION INTRAMUSCULAR; INTRAVENOUS at 08:56

## 2024-07-10 RX ADMIN — CEFAZOLIN SODIUM 2000 MG: 2 SOLUTION INTRAVENOUS at 08:56

## 2024-07-10 RX ADMIN — SODIUM CHLORIDE 125 ML/HR: 0.9 INJECTION, SOLUTION INTRAVENOUS at 07:00

## 2024-07-10 RX ADMIN — DEXAMETHASONE SODIUM PHOSPHATE 8 MG: 4 INJECTION INTRA-ARTICULAR; INTRALESIONAL; INTRAMUSCULAR; INTRAVENOUS; SOFT TISSUE at 09:06

## 2024-07-10 NOTE — INTERVAL H&P NOTE
H&P reviewed. After examining the patient I find no changes in the patients condition since the H&P had been written.    Vitals:    07/10/24 0649   BP: 135/73   Pulse: 71   Resp: 16   Temp: 97.8 °F (36.6 °C)   SpO2: 98%

## 2024-07-10 NOTE — DISCHARGE INSTR - AVS FIRST PAGE
Raz, you have a dressing on your penis.  You can remove the dressing in two days.  Once the dressing has been removed you can shower and apply bacitracin to the incision twice a day to help with faster healing and to prevent infection.    No tub baths or swimming for 2-3 weeks  No sexual activity for 4-6 weeks.     Declines

## 2024-07-10 NOTE — TELEPHONE ENCOUNTER
Pt remains inpatient - continue to monitor for discharge.     *Pt needs 2-3 week appt for wound check - appt given for 7/25/24 at 2pm. Need to confirm with pt.

## 2024-07-10 NOTE — ANESTHESIA PREPROCEDURE EVALUATION
Procedure:  CIRCUMCISION ADULT (Pelvis)    Relevant Problems   ANESTHESIA (within normal limits)      CARDIO (within normal limits)      GI/HEPATIC (within normal limits)      /RENAL (within normal limits)      HEMATOLOGY (within normal limits)      MUSCULOSKELETAL (within normal limits)      NEURO/PSYCH (within normal limits)      PULMONARY (within normal limits)      Urinary   (+) Phimosis        Physical Exam    Airway    Mallampati score: I         Dental   No notable dental hx     Cardiovascular  Cardiovascular exam normal    Pulmonary  Pulmonary exam normal Breath sounds clear to auscultation    Other Findings        Anesthesia Plan  ASA Score- 1     Anesthesia Type- general with ASA Monitors.         Additional Monitors:     Airway Plan: LMA.           Plan Factors-    Chart reviewed.   Existing labs reviewed. Patient summary reviewed.    Patient is not a current smoker.              Induction- intravenous.    Postoperative Plan-         Informed Consent- Anesthetic plan and risks discussed with patient.

## 2024-07-10 NOTE — ANESTHESIA POSTPROCEDURE EVALUATION
"Post-Op Assessment Note    CV Status:  Stable    Pain management: adequate       Mental Status:  Alert and awake   Hydration Status:  Euvolemic   PONV Controlled:  Controlled   Airway Patency:  Patent     Post Op Vitals Reviewed: Yes    No anethesia notable event occurred.    Staff: Anesthesiologist, CRNA               BP      Temp      Pulse     Resp      SpO2      /66   Pulse 58   Temp (!) 97 °F (36.1 °C) (Temporal)   Resp 16   Ht 6' 1\" (1.854 m)   Wt 102 kg (224 lb 10.4 oz)   SpO2 99%   BMI 29.64 kg/m²     "

## 2024-07-10 NOTE — INTERIM OP NOTE
CIRCUMCISION ADULT  Postoperative Note  PATIENT NAME: Raz Gardiner  : 2000  MRN: [id number]  [facility] OR ROOM 04    Surgery Date: 7/10/2024    Preop Diagnosis:  Phimosis [N47.1]  Penile irritation [N48.89]    Post-Op Diagnosis Codes:     * Phimosis [N47.1]     * Penile irritation [N48.89]    Procedure(s) (LRB):  CIRCUMCISION ADULT (N/A)    Surgeons and Role:     * Nehemias New MD - Primary    Specimens:  * No specimens in log *    Estimated Blood Loss:   Minimal    Anesthesia Type:   General     Findings:   Phimosis    Complications:   None    Raz Gardiner is a 24-year-old male with a history of phimosis. Risk and benefits of circumcision were discussed and reviewed. Informed consent was obtained. The patient is brought to the operating room on 7/10/2024. After the smooth induction of general LMA anesthesia, the patient was placed supine. His genitalia was prepped and draped in a sterile fashion. Intravenous antibiotics were administered. A timeout was performed with all members of the operative team confirming the patient's identity and procedure to be performed.    A dorsal penile nerve block was performed with half percent Marcaine mixed equally with 1% lidocaine. A superficial ring block was also performed. A #15 blade was then used to make an outer circumferential skin incision at the level of the corona of the glans penis. The foreskin was then retracted and a second inner circumferential preputial skin incision was made. The foreskin was elevated along the dorsal aspect with hemostats. A [place] clamp was utilized to dissect beneath the foreskin in a longitudinal fashion along the dorsal aspect. Electrocautery was utilized to bisect the foreskin and then to carefully remove it from the underlying tissue taking care to avoid the use of cautery near the urethra. The foreskin was passed off the field as specimen. Point electrocautery was used for hemostasis.    The 2 skin edges were then reapproximated  with 4-0 chromic sutures placed at the 12, 3, 6, and 9:00 positions. Interrupted 4-0 chromic suture was placed in the intervening 4 quadrants. The incision was washed and dried. Bacitracin ointment along with a Melissa roll and Coban was applied.    Overall the patient tolerated the procedure well and were no complications. The patient was activated in the operating room and transferred to the PACU in stable condition at the conclusion of the case.       SIGNATURE: Nehemias New MD   DATE: July 10, 2024   TIME: 10:04 AM

## 2024-07-10 NOTE — OP NOTE
OPERATIVE REPORT  PATIENT NAME: Raz Gardiner    :  2000  MRN: [id number]  Pt Location: [facility] 04    SURGERY DATE: 7/10/2024    Surgeons and Role:     * Nehemias New MD - Primary    Preop Diagnosis:  Phimosis [N47.1]  Penile irritation [N48.89]    Post-Op Diagnosis Codes:     * Phimosis [N47.1]     * Penile irritation [N48.89]    Procedure(s):  CIRCUMCISION ADULT    Specimen(s):  * No specimens in log *    Estimated Blood Loss:   Minimal    Drains:  * No LDAs found *    Anesthesia Type:   General    Operative Indications:  Phimosis [N47.1]  Penile irritation [N48.89]    Operative Findings:  Phimosis    Complications:   None    Procedure and Technique:  Raz Gardiner is a 24-year-old male with a history of phimosis. Risk and benefits of circumcision were discussed and reviewed. Informed consent was obtained. The patient is brought to the operating room on 7/10/2024. After the smooth induction of general LMA anesthesia, the patient was placed supine. His genitalia was prepped and draped in a sterile fashion. Intravenous antibiotics were administered. A timeout was performed with all members of the operative team confirming the patient's identity and procedure to be performed.    A dorsal penile nerve block was performed with half percent Marcaine mixed equally with 1% lidocaine. A superficial ring block was also performed. A #15 blade was then used to make an outer circumferential skin incision at the level of the corona of the glans penis. The foreskin was then retracted and a second inner circumferential preputial skin incision was made. The foreskin was elevated along the dorsal aspect with hemostats. A [place] clamp was utilized to dissect beneath the foreskin in a longitudinal fashion along the dorsal aspect. Electrocautery was utilized to bisect the foreskin and then to carefully remove it from the underlying tissue taking care to avoid the use of cautery near the urethra. The foreskin was passed  off the field as specimen. Point electrocautery was used for hemostasis.    The 2 skin edges were then reapproximated with 4-0 chromic sutures placed at the 12, 3, 6, and 9:00 positions. Interrupted 4-0 chromic suture was placed in the intervening 4 quadrants. The incision was washed and dried. Bacitracin ointment along with a Melissa roll and Coban was applied.    Overall the patient tolerated the procedure well and were no complications. The patient was activated in the operating room and transferred to the PACU in stable condition at the conclusion of the case.    I was present for the entire procedure.    Patient Disposition:  PACU         SIGNATURE: Nehemias New MD  DATE: July 10, 2024  TIME: 10:06 AM

## 2024-07-11 NOTE — TELEPHONE ENCOUNTER
Post Op Note    Raz Gardiner is a 24 y.o. male s/p CIRCUMCISION ADULT (Pelvis) performed 7/10/24.      How would you rate your pain on a scale from 1 to 10, 10 being the worst pain ever?   Have you had a fever?   Have your bowel movements been regular?   Do you have any difficulty urinating?   If the patient has an incision- do you have any redness around the     Do you have any other questions or concerns that I can address at this time? Post op call placed to patient. LVM asking pt to give the office a call back and ask for Leslie LAWRENCE.     Confirm pt is taking prescribed meds - Ibuprofen and Hydrocodone.     *Per Dr. New, pt will need a 2-3 week wound check appointment. Pt was given 7/25/24 at 2 pm with AP. Need to confirm with pt.

## 2024-07-12 NOTE — TELEPHONE ENCOUNTER
Post Op Note     Raz Gardiner is a 24 y.o. male s/p CIRCUMCISION ADULT (Pelvis) performed 7/10/24.       How would you rate your pain on a scale from 1 to 10, 10 being the worst pain ever? 1-2  Have you had a fever? No  Have your bowel movements been regular? Yes  Do you have any difficulty urinating? No  If the patient has an incision- do you have any redness around the incision? No     Do you have any other questions or concerns that I can address at this time? Post op call placed to patient. Pt states he is good. He says he is taking Tylenol for discomfort but he has not needed to take the prescribed meds - Ibuprofen and Hydrocodone. Pt is still with the initial dressing/gauze. He was instructed to remove the dressing today and to leave it open to air. He was in agreement with this plan.     *Per Dr. New, pt will need a 2-3 week wound check appointment. Pt was given 7/25/24 at 2 pm with AP. Pt confirmed appt day/time.

## 2024-07-15 NOTE — TELEPHONE ENCOUNTER
"Patient called in regarding concerning area on underside of penis. Patient is s/p circumcision 7/10. Patient states stitches are intact, no drainage, but there is an area on underside of penis that looks like \"extra skin\" and has some bleeding. Patient reports no fever, no issues w/ urination. Patient is going to send a [employer] message with picture of area for review.     # [phone]  "

## 2024-07-15 NOTE — TELEPHONE ENCOUNTER
Will wait for MyChart message to evaluate area further.  It does sound like it is normal healing tissue and sometimes some bloody drainage is expected during the healing process.

## 2024-07-25 ENCOUNTER — OFFICE VISIT (OUTPATIENT)
Dept: UROLOGY | Facility: MEDICAL CENTER | Age: 24
End: 2024-07-25
Payer: COMMERCIAL

## 2024-07-25 VITALS
OXYGEN SATURATION: 97 % | WEIGHT: 227 LBS | BODY MASS INDEX: 30.09 KG/M2 | HEART RATE: 78 BPM | SYSTOLIC BLOOD PRESSURE: 110 MMHG | HEIGHT: 73 IN | DIASTOLIC BLOOD PRESSURE: 80 MMHG

## 2024-07-25 DIAGNOSIS — Z09 FOLLOW-UP AFTER CIRCUMCISION: Primary | ICD-10-CM

## 2024-07-25 PROCEDURE — 99213 OFFICE O/P EST LOW 20 MIN: CPT

## 2024-07-25 NOTE — PROGRESS NOTES
7/25/2024      Assessment and Plan    24 y.o. male managed by Dr. New    Follow-up after circumcision  Patient underwent adult circumcision procedure 7/10/2024 preformed by Dr. New  The patient is overall doing well following the procedure.  A testicular and penile exam was performed today.  Refer to physical exam findings.  We discussed that the patient should continue to refrain from sexual activity, completely submerging, or heavy lifting for the next 4 weeks  Patient will return on an as needed basis and call if he has any questions or concerns        History of Present Illness  Raz Gardiner is a 24 y.o. male here for evaluation of phimosis and penile irritation status post adult circumcision procedure performed 7/10/2024 by Dr. New.  The patient presents in the office today 2 weeks after his circumcision procedure and notes that there is a great degree of sensitivity around the glans penis initially following the procedure, but a week later the sensitivity and pain has resolved.  Patient notes occasional penile pain when he is moving, but is able to get through the day and sleep through the night without difficulty.  Patient notes that almost all of the sutures have fallen away since the procedure.  Otherwise, the patient denies penile pain, testicular pain, dysuria, hematuria, flank pain, worsening urinary frequency/urgency, or feelings of incomplete bladder emptying.      Review of Systems   Constitutional:  Negative for chills and fever.   HENT:  Negative for ear pain and sore throat.    Eyes:  Negative for pain and visual disturbance.   Respiratory:  Negative for cough and shortness of breath.    Cardiovascular:  Negative for chest pain and palpitations.   Gastrointestinal:  Negative for abdominal pain and vomiting.   Genitourinary:  Positive for penile swelling. Negative for decreased urine volume, difficulty urinating, dysuria, flank pain, frequency, hematuria, penile pain, testicular pain and  "urgency.   Musculoskeletal:  Negative for arthralgias and back pain.   Skin:  Negative for color change and rash.   Neurological:  Negative for seizures and syncope.   All other systems reviewed and are negative.          AUA SYMPTOM SCORE      Flowsheet Row Most Recent Value   AUA SYMPTOM SCORE    How often have you had a sensation of not emptying your bladder completely after you finished urinating? 0 (P)     How often have you had to urinate again less than two hours after you finished urinating? 5 (P)     How often have you found you stopped and started again several times when you urinate? 1 (P)     How often have you found it difficult to postpone urination? 0 (P)     How often have you had a weak urinary stream? 0 (P)     How often have you had to push or strain to begin urination? 1 (P)     How many times did you most typically get up to urinate from the time you went to bed at night until the time you got up in the morning? 1 (P)     Quality of Life: If you were to spend the rest of your life with your urinary condition just the way it is now, how would you feel about that? 1 (P)     AUA SYMPTOM SCORE 8 (P)               Vitals  Vitals:    07/25/24 1350   BP: 110/80   BP Location: Left arm   Patient Position: Sitting   Cuff Size: Adult   Pulse: 78   SpO2: 97%   Weight: 103 kg (227 lb)   Height: 6' 1\" (1.854 m)       Physical Exam  Vitals reviewed.   Constitutional:       General: He is not in acute distress.     Appearance: Normal appearance. He is not ill-appearing.   HENT:      Head: Normocephalic and atraumatic.      Nose: Nose normal.   Eyes:      General: No scleral icterus.  Pulmonary:      Effort: Pulmonary effort is normal. No respiratory distress.   Abdominal:      General: Abdomen is flat. There is no distension.      Palpations: Abdomen is soft.      Tenderness: There is no abdominal tenderness.   Genitourinary:     Comments: Circumcised phallus healing well status post adult circumcision " procedure.  Penis still swollen, but healing well overall without signs of infection including erythema, abnormal discharge, or calor.  No inguinal rash noted.  Testes descended bilaterally and smooth without nodularity.  Musculoskeletal:         General: Normal range of motion.      Cervical back: Normal range of motion.   Skin:     General: Skin is warm and dry.   Neurological:      Mental Status: He is alert and oriented to person, place, and time.      Gait: Gait normal.   Psychiatric:         Mood and Affect: Mood normal.         Behavior: Behavior normal.           Past History  History reviewed. No pertinent past medical history.  Social History     Socioeconomic History    Marital status: Single     Spouse name: None    Number of children: None    Years of education: None    Highest education level: None   Occupational History    None   Tobacco Use    Smoking status: Never    Smokeless tobacco: Never   Vaping Use    Vaping status: Never Used   Substance and Sexual Activity    Alcohol use: Yes     Alcohol/week: 3.0 standard drinks of alcohol     Types: 3 Standard drinks or equivalent per week     Comment: Socially    Drug use: Not Currently    Sexual activity: Yes     Partners: Female     Birth control/protection: Condom Male   Other Topics Concern    None   Social History Narrative    None     Social Determinants of Health     Financial Resource Strain: Not on file   Food Insecurity: Not on file   Transportation Needs: Not on file   Physical Activity: Not on file   Stress: Not on file   Social Connections: Unknown (6/18/2024)    Received from [employer]     How often do you feel lonely or isolated from those around you? (Adult - for ages 18 years and over): Not on file   Intimate Partner Violence: Not on file   Housing Stability: Not on file     Social History     Tobacco Use   Smoking Status Never   Smokeless Tobacco Never     Family History   Problem Relation Age of Onset    Diabetes  "Mother     Diabetes Maternal Grandmother        The following portions of the patient's history were reviewed and updated as appropriate: allergies, current medications, past medical history, past social history, past surgical history and problem list.    Results  No results found for this or any previous visit (from the past 1 hour(s)).]  No results found for: \"PSA\"  No results found for: \"GLUCOSE\", \"CALCIUM\", \"NA\", \"K\", \"CO2\", \"CL\", \"BUN\", \"CREATININE\"  No results found for: \"WBC\", \"HGB\", \"HCT\", \"MCV\", \"PLT\"   "

## 2024-07-25 NOTE — ASSESSMENT & PLAN NOTE
Patient underwent adult circumcision procedure 7/10/2024 preformed by Dr. New  The patient is overall doing well following the procedure.  A testicular and penile exam was performed today.  Refer to physical exam findings.  We discussed that the patient should continue to refrain from sexual activity, completely submerging, or heavy lifting for the next 4 weeks  Patient will return on an as needed basis and call if he has any questions or concerns

## 2025-03-05 ENCOUNTER — OFFICE VISIT (OUTPATIENT)
Dept: UROLOGY | Facility: CLINIC | Age: 25
End: 2025-03-05
Payer: COMMERCIAL

## 2025-03-05 VITALS
HEIGHT: 73 IN | HEART RATE: 80 BPM | WEIGHT: 218 LBS | SYSTOLIC BLOOD PRESSURE: 104 MMHG | BODY MASS INDEX: 28.89 KG/M2 | OXYGEN SATURATION: 98 % | DIASTOLIC BLOOD PRESSURE: 68 MMHG

## 2025-03-05 DIAGNOSIS — N47.1 PHIMOSIS: ICD-10-CM

## 2025-03-05 DIAGNOSIS — N48.89 PENILE IRRITATION: Primary | ICD-10-CM

## 2025-03-05 LAB
SL AMB  POCT GLUCOSE, UA: NORMAL
SL AMB LEUKOCYTE ESTERASE,UA: NORMAL
SL AMB POCT BILIRUBIN,UA: NORMAL
SL AMB POCT BLOOD,UA: NORMAL
SL AMB POCT CLARITY,UA: CLEAR
SL AMB POCT COLOR,UA: YELLOW
SL AMB POCT KETONES,UA: NORMAL
SL AMB POCT NITRITE,UA: NORMAL
SL AMB POCT PH,UA: 7
SL AMB POCT SPECIFIC GRAVITY,UA: 1.03
SL AMB POCT URINE PROTEIN: NORMAL
SL AMB POCT UROBILINOGEN: 0.2

## 2025-03-05 PROCEDURE — 99213 OFFICE O/P EST LOW 20 MIN: CPT

## 2025-03-05 PROCEDURE — 81002 URINALYSIS NONAUTO W/O SCOPE: CPT

## 2025-03-05 NOTE — PROGRESS NOTES
3/5/2025      No chief complaint on file.        Assessment and Plan    24 y.o. male managed by Dr. New    1.  Penile irritation  -History of phimosis and penile irritation status post circumcision performed on 7-10-24 by Dr. New  -Recommended applying Vaseline to the area as needed, ensuring that it remains lubricated prior to sexual activity.  -Normal examination today.  When this occurs again, recommended patient upload picture to [employer].  -Recommended follow-up with Dr. New.  -All questions addressed.  Please do not hesitate to reach out with further questions or concerns.      History of Present Illness  Raz Gardiner is a 24 y.o. male here for evaluation of penile irritation.  Patient is status post adult circumcision procedure performed on 7-10-24 by Dr. New due to previous history of phimosis and penile irritation.  Patient did follow-up in office after procedure for wound check, and was advised to follow-up with our service at that time on an as-needed basis.    Patient states that every time he has intercourse with his partner since having the circumcision, his incision opens and it is sore.  He states it takes 1 to 2 weeks after sex to close it completely.  He does not wear condoms. Patient denies urinary symptoms or voiding complaints.  Denies concerns of STDs as he states he has been with his same partner.      Physical examination reveals circumcised phallus, patent meatus.  Incision shows excellent  wound healing.  There is no swelling, erythema, discharge, irritation.  Patient states his penis appears normal today.  However, patient very bothered that after intercourse every time he claims that his incision completely opens up.  Patient is asymptomatic today and there are no signs of this on physical examination today.    Patient states prior to his circumcision, he dealt with penile irritation except he states this was happening on the bottom of his penis.  He states he was also using  Lotrisone cream and this did not help.    I did recommend that he can try putting on Vaseline.  I also advised patient the next time he notices this happens to upload a picture to his [employer] so that we are able to see what this looks like.  Ultimately, recommended patient follow-up with Dr. New.            Review of Systems   Constitutional:  Negative for activity change, chills, fatigue and fever.   HENT:  Negative for congestion, rhinorrhea and sore throat.    Eyes:  Negative for photophobia, redness and visual disturbance.   Respiratory:  Negative for cough, shortness of breath and wheezing.    Cardiovascular:  Negative for chest pain, palpitations and leg swelling.   Gastrointestinal:  Negative for abdominal pain, diarrhea, nausea and vomiting.   Genitourinary:  Negative for difficulty urinating, dysuria, flank pain, frequency, hematuria, penile discharge, penile pain, penile swelling, scrotal swelling, testicular pain and urgency.   Neurological:  Negative for weakness, light-headedness and headaches.           AUA SYMPTOM SCORE      Flowsheet Row Most Recent Value   AUA SYMPTOM SCORE    How often have you had a sensation of not emptying your bladder completely after you finished urinating? 0 (P)     How often have you had to urinate again less than two hours after you finished urinating? 5 (P)     How often have you found you stopped and started again several times when you urinate? 0 (P)     How often have you found it difficult to postpone urination? 0 (P)     How often have you had a weak urinary stream? 0 (P)     How often have you had to push or strain to begin urination? 0 (P)     How many times did you most typically get up to urinate from the time you went to bed at night until the time you got up in the morning? 1 (P)     Quality of Life: If you were to spend the rest of your life with your urinary condition just the way it is now, how would you feel about that? 1 (P)     AUA SYMPTOM SCORE 6 (P)                Vitals  There were no vitals filed for this visit.    Physical Exam  Constitutional:       Appearance: Normal appearance. He is not toxic-appearing.   HENT:      Head: Normocephalic.      Mouth/Throat:      Pharynx: Oropharynx is clear.   Eyes:      Extraocular Movements: Extraocular movements intact.      Pupils: Pupils are equal, round, and reactive to light.   Pulmonary:      Effort: Pulmonary effort is normal. No respiratory distress.   Genitourinary:     Comments: Physical examination reveals circumcised phallus, patent meatus.  Bilateral testicles equally descended.  Incision shows excellent  wound healing.  There is no swelling, erythema, discharge, irritation.  Musculoskeletal:         General: Normal range of motion.      Cervical back: Normal range of motion.   Neurological:      Mental Status: He is alert and oriented to person, place, and time. Mental status is at baseline.      Gait: Gait normal.   Psychiatric:         Mood and Affect: Mood normal.         Behavior: Behavior normal.         Thought Content: Thought content normal.         Judgment: Judgment normal.           Past History  No past medical history on file.  Social History     Socioeconomic History    Marital status: Single     Spouse name: Not on file    Number of children: Not on file    Years of education: Not on file    Highest education level: Not on file   Occupational History    Not on file   Tobacco Use    Smoking status: Never    Smokeless tobacco: Never   Vaping Use    Vaping status: Never Used   Substance and Sexual Activity    Alcohol use: Yes     Alcohol/week: 3.0 standard drinks of alcohol     Types: 3 Standard drinks or equivalent per week     Comment: Socially    Drug use: Not Currently    Sexual activity: Yes     Partners: Female     Birth control/protection: Condom Male   Other Topics Concern    Not on file   Social History Narrative    Not on file     Social Drivers of Health     Financial Resource Strain:  "Not on file   Food Insecurity: Not on file   Transportation Needs: Not on file   Physical Activity: Not on file   Stress: Not on file   Social Connections: Unknown (6/18/2024)    Received from [facility]     How often do you feel lonely or isolated from those around you? (Adult - for ages 18 years and over): Not on file   Intimate Partner Violence: Not on file   Housing Stability: Not on file     Social History     Tobacco Use   Smoking Status Never   Smokeless Tobacco Never     Family History   Problem Relation Age of Onset    Diabetes Mother     Diabetes Maternal Grandmother        The following portions of the patient's history were reviewed and updated as appropriate: allergies, current medications, past medical history, past social history, past surgical history and problem list.    Results  No results found for this or any previous visit (from the past hour).]  No results found for: \"PSA\"  No results found for: \"GLUCOSE\", \"CALCIUM\", \"NA\", \"K\", \"CO2\", \"CL\", \"BUN\", \"CREATININE\"  No results found for: \"WBC\", \"HGB\", \"HCT\", \"MCV\", \"PLT\"    Yenifer Rosfatoumata, CRNP  "

## 2025-03-21 ENCOUNTER — OFFICE VISIT (OUTPATIENT)
Dept: UROLOGY | Facility: MEDICAL CENTER | Age: 25
End: 2025-03-21
Payer: COMMERCIAL

## 2025-03-21 VITALS
BODY MASS INDEX: 28.89 KG/M2 | WEIGHT: 218 LBS | HEIGHT: 73 IN | OXYGEN SATURATION: 98 % | HEART RATE: 71 BPM | DIASTOLIC BLOOD PRESSURE: 80 MMHG | SYSTOLIC BLOOD PRESSURE: 120 MMHG

## 2025-03-21 DIAGNOSIS — N48.89 PENILE IRRITATION: Primary | ICD-10-CM

## 2025-03-21 PROCEDURE — 99213 OFFICE O/P EST LOW 20 MIN: CPT | Performed by: UROLOGY

## 2025-03-21 RX ORDER — CLOTRIMAZOLE AND BETAMETHASONE DIPROPIONATE 10; .64 MG/G; MG/G
CREAM TOPICAL 2 TIMES DAILY
Qty: 15 G | Refills: 0 | Status: SHIPPED | OUTPATIENT
Start: 2025-03-21

## 2025-03-21 NOTE — ASSESSMENT & PLAN NOTE
History of tight phimosis with chronic penile irritation, tearing of skin and swelling after intercourse s/p circumcision in 7/2024  One spot with persistent tearing after intercourse on phallus proximal to well healed circumcision incision   - given course of clotrimazole/betamethasone to see if any improvements  - no significant sclerosis or discoloration noted on exam  - if no improvements discussed dermatology referral     Orders:    clotrimazole-betamethasone (LOTRISONE) 1-0.05 % cream; Apply topically 2 (two) times a day

## 2025-03-21 NOTE — PROGRESS NOTES
Name: Raz Gardiner      : 2000      MRN: [id number]  Encounter Provider: Nehemias New MD  Encounter Date: 3/21/2025   Encounter department: [facility] [place]  :  Assessment & Plan  Penile irritation  History of tight phimosis with chronic penile irritation, tearing of skin and swelling after intercourse s/p circumcision in 2024  One spot with persistent tearing after intercourse on phallus proximal to well healed circumcision incision   - given course of clotrimazole/betamethasone to see if any improvements  - no significant sclerosis or discoloration noted on exam  - if no improvements discussed dermatology referral     Orders:    clotrimazole-betamethasone (LOTRISONE) 1-0.05 % cream; Apply topically 2 (two) times a day        History of Present Illness   Raz Gardiner is a 24 y.o. male who presents for follow up    Had circumcision in 2024  Reports one area on phallus with persistent microtears after intercourse.  Not improved with lubricant during sex.        Review of Systems   All other systems reviewed and are negative.    Past Medical History   History reviewed. No pertinent past medical history.  Past Surgical History:   Procedure Laterality Date    ELBOW SURGERY Right     HI CIRCUMCISION AGE >28 DAYS N/A 7/10/2024    Procedure: CIRCUMCISION ADULT;  Surgeon: Nehemias New MD;  Location: [facility];  Service: Urology     Family History   Problem Relation Age of Onset    Diabetes Mother     Diabetes Maternal Grandmother       reports that he has never smoked. He has never used smokeless tobacco. He reports current alcohol use of about 3.0 standard drinks of alcohol per week. He reports that he does not currently use drugs.  Current Outpatient Medications   Medication Instructions    clotrimazole-betamethasone (LOTRISONE) 1-0.05 % cream Topical, 2 times daily    Creatine POWD Does not apply, Daily    ibuprofen (MOTRIN) 600 mg, Oral, Every 6 hours PRN    Protein POWD Oral,  "Daily   No Known Allergies      Objective   /80 (BP Location: Left arm, Patient Position: Sitting, Cuff Size: Standard)   Pulse 71   Ht 6' 1\" (1.854 m)   Wt 98.9 kg (218 lb)   SpO2 98%   BMI 28.76 kg/m²     Physical Exam  Vitals and nursing note reviewed.   Constitutional:       General: He is not in acute distress.     Appearance: He is well-developed.   HENT:      Head: Normocephalic and atraumatic.   Eyes:      Conjunctiva/sclera: Conjunctivae normal.   Cardiovascular:      Rate and Rhythm: Normal rate and regular rhythm.      Heart sounds: No murmur heard.  Pulmonary:      Effort: Pulmonary effort is normal. No respiratory distress.      Breath sounds: Normal breath sounds.   Abdominal:      Palpations: Abdomen is soft.      Tenderness: There is no abdominal tenderness.   Genitourinary:     Comments: Well healed circumcision incision  No discoloration of skin on phallus  No open tears in skin at this time  Musculoskeletal:         General: No swelling.      Cervical back: Neck supple.   Skin:     General: Skin is warm and dry.      Capillary Refill: Capillary refill takes less than 2 seconds.   Neurological:      Mental Status: He is alert.   Psychiatric:         Mood and Affect: Mood normal.          Results   No results found for: \"PSA\"  No results found for: \"GLUCOSE\", \"CALCIUM\", \"NA\", \"K\", \"CO2\", \"CL\", \"BUN\", \"CREATININE\"  No results found for: \"WBC\", \"HGB\", \"HCT\", \"MCV\", \"PLT\"    Office Urine Dip  No results found for this or any previous visit (from the past hour).      "

## 2025-08-12 ENCOUNTER — OFFICE VISIT (OUTPATIENT)
Dept: FAMILY MEDICINE CLINIC | Facility: CLINIC | Age: 25
End: 2025-08-12
Payer: COMMERCIAL

## 2025-08-12 ENCOUNTER — APPOINTMENT (OUTPATIENT)
Dept: LAB | Facility: CLINIC | Age: 25
End: 2025-08-12
Payer: COMMERCIAL

## (undated) DEVICE — PENCIL ELECTROSURG E-Z CLEAN -0035H

## (undated) DEVICE — DRAPE EQUIPMENT RF WAND

## (undated) DEVICE — COBAN 1 IN UNSTERILE

## (undated) DEVICE — OCCLUSIVE GAUZE STRIP,3% BISMUTH TRIBROMOPHENATE IN PETROLATUM BLEND: Brand: XEROFORM

## (undated) DEVICE — CAUTERY TIP POLISHER: Brand: DEVON

## (undated) DEVICE — STRETCH BANDAGE: Brand: CURITY

## (undated) DEVICE — BETHLEHEM UNIVERSAL MINOR GEN: Brand: CARDINAL HEALTH

## (undated) DEVICE — SCD SEQUENTIAL COMPRESSION COMFORT SLEEVE MEDIUM KNEE LENGTH: Brand: KENDALL SCD

## (undated) DEVICE — INTENDED FOR TISSUE SEPARATION, AND OTHER PROCEDURES THAT REQUIRE A SHARP SURGICAL BLADE TO PUNCTURE OR CUT.: Brand: BARD-PARKER SAFETY BLADES SIZE 15, STERILE

## (undated) DEVICE — ELECTRODE NEEDLE MOD E-Z CLEAN 2.75IN 7CM -0013M

## (undated) DEVICE — NEEDLE 25G X 1 1/2

## (undated) DEVICE — SUT CHROMIC 4-0 PS-2 18 IN 1637G

## (undated) DEVICE — CHLORAPREP HI-LITE 26ML ORANGE